# Patient Record
Sex: MALE | Race: WHITE | NOT HISPANIC OR LATINO | Employment: PART TIME | URBAN - METROPOLITAN AREA
[De-identification: names, ages, dates, MRNs, and addresses within clinical notes are randomized per-mention and may not be internally consistent; named-entity substitution may affect disease eponyms.]

---

## 2022-04-10 ENCOUNTER — HISTORICAL (OUTPATIENT)
Dept: ADMINISTRATIVE | Facility: HOSPITAL | Age: 52
End: 2022-04-10
Payer: MEDICAID

## 2022-04-29 VITALS
DIASTOLIC BLOOD PRESSURE: 82 MMHG | SYSTOLIC BLOOD PRESSURE: 137 MMHG | HEIGHT: 74 IN | WEIGHT: 220.25 LBS | OXYGEN SATURATION: 100 % | BODY MASS INDEX: 28.27 KG/M2

## 2022-06-06 DIAGNOSIS — N18.9 CHRONIC KIDNEY DISEASE, UNSPECIFIED CKD STAGE: Primary | ICD-10-CM

## 2022-06-17 RX ORDER — LISINOPRIL 20 MG/1
20 TABLET ORAL DAILY
COMMUNITY
Start: 2022-05-23 | End: 2023-02-22 | Stop reason: SDUPTHER

## 2022-06-17 RX ORDER — LISINOPRIL AND HYDROCHLOROTHIAZIDE 12.5; 2 MG/1; MG/1
1 TABLET ORAL DAILY
COMMUNITY
Start: 2022-01-20 | End: 2022-06-22 | Stop reason: ALTCHOICE

## 2022-06-17 RX ORDER — SILDENAFIL CITRATE 20 MG/1
TABLET ORAL
COMMUNITY
Start: 2022-05-23

## 2022-06-22 ENCOUNTER — OFFICE VISIT (OUTPATIENT)
Dept: NEPHROLOGY | Facility: CLINIC | Age: 52
End: 2022-06-22
Payer: MEDICAID

## 2022-06-22 VITALS
OXYGEN SATURATION: 99 % | WEIGHT: 221.38 LBS | TEMPERATURE: 98 F | BODY MASS INDEX: 27.53 KG/M2 | RESPIRATION RATE: 20 BRPM | DIASTOLIC BLOOD PRESSURE: 77 MMHG | SYSTOLIC BLOOD PRESSURE: 137 MMHG | HEART RATE: 68 BPM | HEIGHT: 75 IN

## 2022-06-22 DIAGNOSIS — N18.2 CKD STAGE G2/A1, GFR 60-89 AND ALBUMIN CREATININE RATIO <30 MG/G: Primary | ICD-10-CM

## 2022-06-22 DIAGNOSIS — Z72.0 TOBACCO ABUSE: ICD-10-CM

## 2022-06-22 PROCEDURE — 4010F ACE/ARB THERAPY RXD/TAKEN: CPT | Mod: CPTII,,, | Performed by: NURSE PRACTITIONER

## 2022-06-22 PROCEDURE — 99213 OFFICE O/P EST LOW 20 MIN: CPT | Mod: S$PBB,,, | Performed by: NURSE PRACTITIONER

## 2022-06-22 PROCEDURE — 4010F PR ACE/ARB THEARPY RXD/TAKEN: ICD-10-PCS | Mod: CPTII,,, | Performed by: NURSE PRACTITIONER

## 2022-06-22 PROCEDURE — 3075F SYST BP GE 130 - 139MM HG: CPT | Mod: CPTII,,, | Performed by: NURSE PRACTITIONER

## 2022-06-22 PROCEDURE — 1159F MED LIST DOCD IN RCRD: CPT | Mod: CPTII,,, | Performed by: NURSE PRACTITIONER

## 2022-06-22 PROCEDURE — 3008F PR BODY MASS INDEX (BMI) DOCUMENTED: ICD-10-PCS | Mod: CPTII,,, | Performed by: NURSE PRACTITIONER

## 2022-06-22 PROCEDURE — 1160F PR REVIEW ALL MEDS BY PRESCRIBER/CLIN PHARMACIST DOCUMENTED: ICD-10-PCS | Mod: CPTII,,, | Performed by: NURSE PRACTITIONER

## 2022-06-22 PROCEDURE — 3078F DIAST BP <80 MM HG: CPT | Mod: CPTII,,, | Performed by: NURSE PRACTITIONER

## 2022-06-22 PROCEDURE — 3066F NEPHROPATHY DOC TX: CPT | Mod: CPTII,,, | Performed by: NURSE PRACTITIONER

## 2022-06-22 PROCEDURE — 1159F PR MEDICATION LIST DOCUMENTED IN MEDICAL RECORD: ICD-10-PCS | Mod: CPTII,,, | Performed by: NURSE PRACTITIONER

## 2022-06-22 PROCEDURE — 99214 OFFICE O/P EST MOD 30 MIN: CPT | Mod: PBBFAC | Performed by: NURSE PRACTITIONER

## 2022-06-22 PROCEDURE — 3066F PR DOCUMENTATION OF TREATMENT FOR NEPHROPATHY: ICD-10-PCS | Mod: CPTII,,, | Performed by: NURSE PRACTITIONER

## 2022-06-22 PROCEDURE — 3075F PR MOST RECENT SYSTOLIC BLOOD PRESS GE 130-139MM HG: ICD-10-PCS | Mod: CPTII,,, | Performed by: NURSE PRACTITIONER

## 2022-06-22 PROCEDURE — 1160F RVW MEDS BY RX/DR IN RCRD: CPT | Mod: CPTII,,, | Performed by: NURSE PRACTITIONER

## 2022-06-22 PROCEDURE — 3078F PR MOST RECENT DIASTOLIC BLOOD PRESSURE < 80 MM HG: ICD-10-PCS | Mod: CPTII,,, | Performed by: NURSE PRACTITIONER

## 2022-06-22 PROCEDURE — 99213 PR OFFICE/OUTPT VISIT, EST, LEVL III, 20-29 MIN: ICD-10-PCS | Mod: S$PBB,,, | Performed by: NURSE PRACTITIONER

## 2022-06-22 PROCEDURE — 3008F BODY MASS INDEX DOCD: CPT | Mod: CPTII,,, | Performed by: NURSE PRACTITIONER

## 2022-06-22 NOTE — PROGRESS NOTES
"[facility] Nephrology Clinic Note    Chief Complaint   Patient presents with    Chronic Kidney Disease     Follow up        History of Present Illness  This is a pleasant 51-year-old  male with past medical history of hypertension, kidney disease, and tobacco abuse. Does report family history of hypertension but no family history of kidney disease.  Presents for follow-up today, denies complaints.    Review of Systems  Twelve point review of systems conducted, negative except as stated in history of present illness.    Review of patient's allergies indicates:   Allergen Reactions    Penicillin Rash       Past Medical History:   Past Medical History:   Diagnosis Date    Chronic kidney disease, unspecified     HTN (hypertension)     Tobacco abuse        Procedure History: History reviewed. No pertinent surgical history.    Family History: family history includes Hypertension in his brother, father, mother, and sister.    Social History:  reports that he has been smoking cigarettes. He has been smoking about 1.00 pack per day. He has never used smokeless tobacco. He reports that he does not drink alcohol and does not use drugs.    Physical Exam:   /77 (BP Location: Left arm, Patient Position: Sitting, BP Method: Large (Automatic))   Pulse 68   Temp 97.8 °F (36.6 °C) (Oral)   Resp 20   Ht 6' 3" (1.905 m)   Wt 100.4 kg (221 lb 6.4 oz)   SpO2 99%   BMI 27.67 kg/m²  Body mass index is 27.67 kg/m².  General appearance: Patient is in no acute distress.  Skin: No rashes or wounds.  HEENT: PERRLA, EOMI, no scleral icterus, no JVD. Neck is supple.  Chest: Respirations are unlabored. Lungs sounds are clear.   Heart: S1, S2.   Abdomen: Benign.  : Deferred.  Extremities: No edema, peripheral pulses are palpable.   Neuro: No focal deficits.     Home Medications:  Current Outpatient Medications   Medication Sig    lisinopriL (PRINIVIL,ZESTRIL) 20 MG tablet Take 20 mg by mouth once daily.    " lisinopriL-hydrochlorothiazide (PRINZIDE,ZESTORETIC) 20-12.5 mg per tablet Take 1 tablet by mouth once daily.    sildenafil (REVATIO) 20 mg Tab SMARTSI-2 Tablet(s) By Mouth     No current facility-administered medications for this visit.        Laboratory Data:   06/15/2022:  Sodium 136, potassium 4.3, chloride 103, CO2 27, glucose 94, BUN 16, creatinine 1.18, estimated GFR 69, albumin 3.8, calcium 8.6, AST 19, ALT 19, alkaline phosphatase 92, bilirubin 0.5    Imaging:  Kidney ultrasound 2022:  The visualized aorta is within normal limits, IVC not visualized due to bowel gas.  Right kidney 9.8 cm in length, left kidney 10.8 cm in length, no hydronephrosis, cystic, or solid renal masses.  Bladder is incompletely distended and otherwise unremarkable.    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 There is no significant proteinuria or active urinary sediment on urinalysis.  Kidney ultrasound is essentially normal.  Importance of avoiding excessive NSAID use explained to the patient.   Serum creatinine has improved.  Continue periodic monitoring.  Return to clinic with routine labs in 8 months.    Tobacco abuse  -     Ambulatory referral/consult to Smoking Cessation Program; Future; Expected date: 2022    BMI 27.0-27.9,adult  Lifestyle and dietary interventions discussed, patient counseled on weight loss using portion control, non sedentary lifestyle, low-carbohydrate/low fat diet.

## 2023-02-14 ENCOUNTER — TELEPHONE (OUTPATIENT)
Dept: PULMONOLOGY | Facility: CLINIC | Age: 53
End: 2023-02-14
Payer: MEDICAID

## 2023-02-14 NOTE — TELEPHONE ENCOUNTER
----- Message from Charito Wilson sent at 2/14/2023  3:16 PM CST -----  Regarding: Labs  Pt would like send labs [facility] Fax # [phone]  Pt # [phone]

## 2023-02-22 ENCOUNTER — OFFICE VISIT (OUTPATIENT)
Dept: NEPHROLOGY | Facility: CLINIC | Age: 53
End: 2023-02-22
Payer: MEDICAID

## 2023-02-22 VITALS
SYSTOLIC BLOOD PRESSURE: 148 MMHG | TEMPERATURE: 98 F | BODY MASS INDEX: 27.63 KG/M2 | WEIGHT: 222.19 LBS | HEART RATE: 66 BPM | HEIGHT: 75 IN | OXYGEN SATURATION: 100 % | RESPIRATION RATE: 18 BRPM | DIASTOLIC BLOOD PRESSURE: 82 MMHG

## 2023-02-22 DIAGNOSIS — Z72.0 TOBACCO ABUSE: ICD-10-CM

## 2023-02-22 DIAGNOSIS — I10 PRIMARY HYPERTENSION: ICD-10-CM

## 2023-02-22 DIAGNOSIS — N18.2 CKD STAGE G2/A1, GFR 60-89 AND ALBUMIN CREATININE RATIO <30 MG/G: Primary | ICD-10-CM

## 2023-02-22 PROCEDURE — 3077F SYST BP >= 140 MM HG: CPT | Mod: CPTII,,, | Performed by: NURSE PRACTITIONER

## 2023-02-22 PROCEDURE — 3066F PR DOCUMENTATION OF TREATMENT FOR NEPHROPATHY: ICD-10-PCS | Mod: CPTII,,, | Performed by: NURSE PRACTITIONER

## 2023-02-22 PROCEDURE — 3077F PR MOST RECENT SYSTOLIC BLOOD PRESSURE >= 140 MM HG: ICD-10-PCS | Mod: CPTII,,, | Performed by: NURSE PRACTITIONER

## 2023-02-22 PROCEDURE — 3079F PR MOST RECENT DIASTOLIC BLOOD PRESSURE 80-89 MM HG: ICD-10-PCS | Mod: CPTII,,, | Performed by: NURSE PRACTITIONER

## 2023-02-22 PROCEDURE — 4010F ACE/ARB THERAPY RXD/TAKEN: CPT | Mod: CPTII,,, | Performed by: NURSE PRACTITIONER

## 2023-02-22 PROCEDURE — 99213 PR OFFICE/OUTPT VISIT, EST, LEVL III, 20-29 MIN: ICD-10-PCS | Mod: S$PBB,,, | Performed by: NURSE PRACTITIONER

## 2023-02-22 PROCEDURE — 3008F PR BODY MASS INDEX (BMI) DOCUMENTED: ICD-10-PCS | Mod: CPTII,,, | Performed by: NURSE PRACTITIONER

## 2023-02-22 PROCEDURE — 4010F PR ACE/ARB THEARPY RXD/TAKEN: ICD-10-PCS | Mod: CPTII,,, | Performed by: NURSE PRACTITIONER

## 2023-02-22 PROCEDURE — 99213 OFFICE O/P EST LOW 20 MIN: CPT | Mod: S$PBB,,, | Performed by: NURSE PRACTITIONER

## 2023-02-22 PROCEDURE — 1159F MED LIST DOCD IN RCRD: CPT | Mod: CPTII,,, | Performed by: NURSE PRACTITIONER

## 2023-02-22 PROCEDURE — 3079F DIAST BP 80-89 MM HG: CPT | Mod: CPTII,,, | Performed by: NURSE PRACTITIONER

## 2023-02-22 PROCEDURE — 1159F PR MEDICATION LIST DOCUMENTED IN MEDICAL RECORD: ICD-10-PCS | Mod: CPTII,,, | Performed by: NURSE PRACTITIONER

## 2023-02-22 PROCEDURE — 3008F BODY MASS INDEX DOCD: CPT | Mod: CPTII,,, | Performed by: NURSE PRACTITIONER

## 2023-02-22 PROCEDURE — 3066F NEPHROPATHY DOC TX: CPT | Mod: CPTII,,, | Performed by: NURSE PRACTITIONER

## 2023-02-22 PROCEDURE — 99214 OFFICE O/P EST MOD 30 MIN: CPT | Mod: PBBFAC | Performed by: NURSE PRACTITIONER

## 2023-02-22 RX ORDER — LISINOPRIL 40 MG/1
40 TABLET ORAL DAILY
Qty: 90 TABLET | Refills: 3 | Status: SHIPPED | OUTPATIENT
Start: 2023-02-22 | End: 2024-02-22

## 2023-02-22 NOTE — PROGRESS NOTES
"[facility]  Nephrology Clinic Note    Chief complaint: Chronic Kidney Disease (RTC, took meds, declined flu vaccine)    History of present illness:   Faustino Morgan is a 52 y.o. White male with past medical history of hypertension, kidney disease, and tobacco abuse. Does report family history of hypertension but no family history of kidney disease.  Presents for follow-up today, denies complaints.    Review of Systems  12 point review of systems conducted, negative except as stated in the history of present illness.    Allergies: Patient is allergic to penicillin.     Past Medical History:  has a past medical history of Chronic kidney disease, unspecified, HTN (hypertension), and Tobacco abuse.    Procedure History:  has no past surgical history on file.    Family History: family history includes Hypertension in his brother, father, mother, and sister.    Social History:  reports that he has been smoking cigarettes. He has been smoking an average of 1 pack per day. He has never used smokeless tobacco. He reports that he does not drink alcohol and does not use drugs.    Physical exam  BP (!) 148/82 (BP Location: Right arm, Patient Position: Sitting, BP Method: Large (Manual))   Pulse 66   Temp 98.1 °F (36.7 °C) (Oral)   Resp 18   Ht 6' 2.8" (1.9 m)   Wt 100.8 kg (222 lb 3.2 oz)   SpO2 100%   BMI 27.92 kg/m²   General appearance: Patient is in no acute distress.  Skin: No rashes or wounds.  HEENT: PERRLA, EOMI, no scleral icterus, no JVD. Neck is supple.  Chest: Respirations are unlabored. Lungs sounds are clear.   Heart: S1, S2.   Abdomen: Benign.  : Deferred.  Extremities: No edema, peripheral pulses are palpable.   Neuro: No focal deficits.     Home Medications:    Current Outpatient Medications:     sildenafil (REVATIO) 20 mg Tab, SMARTSI-2 Tablet(s) By Mouth, Disp: , Rfl:     lisinopriL (PRINIVIL,ZESTRIL) 40 MG tablet, Take 1 tablet (40 mg total) by mouth once daily., Disp: " 90 tablet, Rfl: 3    Laboratory data  02/16/2023:  Urine protein <5, urinalysis unremarkable, sodium 137, potassium 4.2, chloride 102 CO2 30, glucose 95, BUN 17 creatinine 1.23, albumin 3.6, calcium 8.9, alkaline phosphatase 84, AST 17, ALT 31, GFR 65.    Imaging  Kidney ultrasound 03/03/2022:  The visualized aorta is within normal limits, IVC not visualized due to bowel gas.  Right kidney 9.8 cm in length, left kidney 10.8 cm in length, no hydronephrosis, cystic, or solid renal masses.  Bladder is incompletely distended and otherwise unremarkable.    Impression and plan     CKD stage G2/A1, GFR 60-89 and albumin creatinine ratio <30 mg/g  -     Comprehensive Metabolic Panel; Future; Expected date: 01/22/2024  -     Protein/Creatinine Ratio, Urine; Future; Expected date: 01/22/2024  -     Urinalysis, Reflex to Urine Culture; Future; Expected date: 01/22/2024    Possibly analgesic nephropathy. There is no significant proteinuria or active urinary sediment on urinalysis.  Kidney ultrasound is essentially normal.  Importance of avoiding excessive NSAID use and staying well hydrated explained to the patient.   Continue periodic monitoring.  Return to clinic with routine labs in 12 months.    Primary hypertension  -     lisinopriL (PRINIVIL,ZESTRIL) 40 MG tablet; Take 1 tablet (40 mg total) by mouth once daily.  Dispense: 90 tablet; Refill: 3  Blood pressure reading is above goal.  Will increase lisinopril to 40 mg by mouth daily.      Tobacco abuse  Patient was counseled on importance of tobacco use cessation.  Strongly encouraged to quit, offered a referral to a smoking cessation program.        2/22/2023  Sydni Guevara NP  [facility] Nephrology

## 2024-02-14 ENCOUNTER — TELEPHONE (OUTPATIENT)
Dept: NEPHROLOGY | Facility: CLINIC | Age: 54
End: 2024-02-14
Payer: MEDICAID

## 2024-02-14 NOTE — TELEPHONE ENCOUNTER
----- Message from Chana Gallegos sent at 2/14/2024  9:08 AM CST -----  Regarding: Lab Orders  Pt is in need of Lab to be faxed to [facility]. The fax number is [phone]. Pt can be reached at [phone]    Thank You

## 2024-02-22 ENCOUNTER — OFFICE VISIT (OUTPATIENT)
Dept: NEPHROLOGY | Facility: CLINIC | Age: 54
End: 2024-02-22
Payer: MEDICAID

## 2024-02-22 VITALS
DIASTOLIC BLOOD PRESSURE: 72 MMHG | WEIGHT: 218.81 LBS | BODY MASS INDEX: 28.08 KG/M2 | RESPIRATION RATE: 20 BRPM | HEART RATE: 66 BPM | SYSTOLIC BLOOD PRESSURE: 148 MMHG | OXYGEN SATURATION: 99 % | HEIGHT: 74 IN | TEMPERATURE: 98 F

## 2024-02-22 DIAGNOSIS — N18.2 CKD STAGE G2/A2, GFR 60-89 AND ALBUMIN CREATININE RATIO 30-299 MG/G: Primary | ICD-10-CM

## 2024-02-22 DIAGNOSIS — I10 PRIMARY HYPERTENSION: ICD-10-CM

## 2024-02-22 DIAGNOSIS — Z72.0 TOBACCO ABUSE: ICD-10-CM

## 2024-02-22 PROCEDURE — 99214 OFFICE O/P EST MOD 30 MIN: CPT | Mod: PBBFAC | Performed by: NURSE PRACTITIONER

## 2024-02-22 PROCEDURE — 99213 OFFICE O/P EST LOW 20 MIN: CPT | Mod: S$PBB,,, | Performed by: NURSE PRACTITIONER

## 2024-02-22 PROCEDURE — 3077F SYST BP >= 140 MM HG: CPT | Mod: CPTII,,, | Performed by: NURSE PRACTITIONER

## 2024-02-22 PROCEDURE — 1159F MED LIST DOCD IN RCRD: CPT | Mod: CPTII,,, | Performed by: NURSE PRACTITIONER

## 2024-02-22 PROCEDURE — 3066F NEPHROPATHY DOC TX: CPT | Mod: CPTII,,, | Performed by: NURSE PRACTITIONER

## 2024-02-22 PROCEDURE — 3078F DIAST BP <80 MM HG: CPT | Mod: CPTII,,, | Performed by: NURSE PRACTITIONER

## 2024-02-22 PROCEDURE — 3008F BODY MASS INDEX DOCD: CPT | Mod: CPTII,,, | Performed by: NURSE PRACTITIONER

## 2024-02-22 RX ORDER — AMLODIPINE BESYLATE 5 MG/1
5 TABLET ORAL DAILY
Qty: 90 TABLET | Refills: 3 | Status: SHIPPED | OUTPATIENT
Start: 2024-02-22 | End: 2025-02-21

## 2024-02-22 NOTE — PROGRESS NOTES
"[facility]  Nephrology Clinic Note    Chief complaint: Chronic Kidney Disease (Follow up)    History of present illness:   Faustino Morgan is a 53 y.o. White male with past medical history of hypertension, kidney disease, and tobacco abuse.  Patient presents for follow-up appointment in Nephrology Clinic today.    Review of Systems  12 point review of systems conducted, negative except as stated in the history of present illness.    Allergies: Patient is allergic to penicillin.     Past Medical History:  has a past medical history of Chronic kidney disease, unspecified, HTN (hypertension), and Tobacco abuse.    Procedure History:  has no past surgical history on file.    Family History: family history includes Hypertension in his brother, father, mother, and sister.    Social History:  reports that he has been smoking cigarettes. He started smoking about 20 years ago. He has a 20.1 pack-year smoking history. He has never used smokeless tobacco. He reports that he does not drink alcohol and does not use drugs.    Physical exam  BP (!) 148/72 (BP Location: Left arm, Patient Position: Sitting, BP Method: Large (Manual))   Pulse 66   Temp 98.3 °F (36.8 °C) (Oral)   Resp 20   Ht 6' 2" (1.88 m)   Wt 99.2 kg (218 lb 12.8 oz)   SpO2 99%   BMI 28.09 kg/m²   General appearance: Patient is in no acute distress.  Skin: No rashes or wounds.  HEENT: PERRLA, EOMI, no scleral icterus, no JVD. Neck is supple.  Chest: Respirations are unlabored. Lungs sounds are clear.   Heart: S1, S2.   Abdomen: Benign.  : Deferred.  Extremities: No edema, peripheral pulses are palpable.   Neuro: No focal deficits.     Home Medications:    Current Outpatient Medications:     lisinopriL (PRINIVIL,ZESTRIL) 40 MG tablet, Take 1 tablet (40 mg total) by mouth once daily., Disp: 90 tablet, Rfl: 3    sildenafil (REVATIO) 20 mg Tab, SMARTSI-2 Tablet(s) By Mouth, Disp: , Rfl:     amLODIPine (NORVASC) 5 MG tablet, Take 1 " tablet (5 mg total) by mouth once daily., Disp: 90 tablet, Rfl: 3    Laboratory data                                Imaging  Kidney ultrasound 03/03/2022:  The visualized aorta is within normal limits, IVC not visualized due to bowel gas.  Right kidney 9.8 cm in length, left kidney 10.8 cm in length, no hydronephrosis, cystic, or solid renal masses.  Bladder is incompletely distended and otherwise unremarkable.     Impression    ICD-10-CM ICD-9-CM   1. CKD stage G2/A2, GFR 60-89 and albumin creatinine ratio  mg/g  N18.2 585.2   2. Primary hypertension  I10 401.9   3. Tobacco abuse  Z72.0 305.1   4. BMI 28.0-28.9,adult  Z68.28 V85.24        Plan     CKD stage G2/A2, GFR 60-89 and albumin creatinine ratio  mg/g  -     Comprehensive Metabolic Panel; Future; Expected date: 08/10/2024  -     Protein/Creatinine Ratio, Urine; Future; Expected date: 08/10/2024  -     Urinalysis, Reflex to Urine Culture; Future; Expected date: 08/10/2024  Possibly analgesic nephropathy. There is no significant proteinuria or active urinary sediment on urinalysis.  Kidney ultrasound is essentially normal.  Importance of avoiding excessive NSAID use and staying well hydrated explained to the patient.   Continue periodic monitoring.  Return to clinic with routine labs in 6 months.    Primary hypertension  -     amLODIPine (NORVASC) 5 MG tablet; Take 1 tablet (5 mg total) by mouth once daily.  Dispense: 90 tablet; Refill: 3  BP reading is above goal. Will add Amlodipine to the regimen.     Tobacco abuse  Patient was counseled on importance of tobacco use cessation.  Strongly encouraged to quit, offered a referral to a smoking cessation program.    BMI 28.0-28.9,adult  Lifestyle and dietary interventions discussed, patient encouraged to maintain non-sedentary lifestyle and well-balanced diet.           2/22/2024  Sydni Guevara NP  [facility] Nephrology

## 2024-11-07 ENCOUNTER — OFFICE VISIT (OUTPATIENT)
Dept: NEPHROLOGY | Facility: CLINIC | Age: 54
End: 2024-11-07
Payer: MEDICAID

## 2024-11-07 VITALS
HEIGHT: 74 IN | RESPIRATION RATE: 18 BRPM | BODY MASS INDEX: 27.95 KG/M2 | HEART RATE: 69 BPM | WEIGHT: 217.81 LBS | OXYGEN SATURATION: 100 % | DIASTOLIC BLOOD PRESSURE: 76 MMHG | TEMPERATURE: 98 F | SYSTOLIC BLOOD PRESSURE: 130 MMHG

## 2024-11-07 DIAGNOSIS — I10 PRIMARY HYPERTENSION: ICD-10-CM

## 2024-11-07 DIAGNOSIS — Z72.0 TOBACCO ABUSE: ICD-10-CM

## 2024-11-07 DIAGNOSIS — N18.2 CKD STAGE G2/A1, GFR 60-89 AND ALBUMIN CREATININE RATIO <30 MG/G: Primary | ICD-10-CM

## 2024-11-07 PROCEDURE — 99215 OFFICE O/P EST HI 40 MIN: CPT | Mod: PBBFAC | Performed by: NURSE PRACTITIONER

## 2024-11-07 NOTE — PROGRESS NOTES
"[facility]  Nephrology Clinic Note    Chief complaint: Follow-up (RTC, took meds, concerned about lab results)    History of present illness:   Fautsino Morgan is a 54 y.o. White male with past medical history of hypertension, kidney disease, and tobacco abuse.  Patient presents for follow-up appointment in Nephrology Clinic today.    Review of Systems  12 point review of systems conducted, negative except as stated in the history of present illness.    Allergies: Patient is allergic to penicillin.     Past Medical History:  has a past medical history of Chronic kidney disease, unspecified, HTN (hypertension), and Tobacco abuse.    Procedure History:  has no past surgical history on file.    Family History: family history includes Hypertension in his brother, father, mother, and sister.    Social History:  reports that he has been smoking cigarettes. He started smoking about 20 years ago. He has a 20.8 pack-year smoking history. He has never used smokeless tobacco. He reports that he does not drink alcohol and does not use drugs.    Physical exam  /76 (BP Location: Right arm, Patient Position: Sitting)   Pulse 69   Temp 98 °F (36.7 °C) (Oral)   Resp 18   Ht 6' 2.02" (1.88 m)   Wt 98.8 kg (217 lb 13 oz)   SpO2 100%   BMI 27.95 kg/m²   General appearance: Patient is in no acute distress.  Skin: No rashes or wounds.  HEENT: PERRLA, EOMI, no scleral icterus, no JVD. Neck is supple.  Chest: Respirations are unlabored. Lungs sounds are clear.   Heart: S1, S2.   Abdomen: Benign.  : Deferred.  Extremities: No edema, peripheral pulses are palpable.   Neuro: No focal deficits.     Home Medications:    Current Outpatient Medications:     amLODIPine (NORVASC) 5 MG tablet, Take 1 tablet (5 mg total) by mouth once daily., Disp: 90 tablet, Rfl: 3    lisinopriL (PRINIVIL,ZESTRIL) 40 MG tablet, Take 1 tablet (40 mg total) by mouth once daily., Disp: 90 tablet, Rfl: 3    sildenafil (REVATIO) " 20 mg Tab, SMARTSI-2 Tablet(s) By Mouth, Disp: , Rfl:     Laboratory data              Imaging  Kidney ultrasound 2022:  The visualized aorta is within normal limits, IVC not visualized due to bowel gas.  Right kidney 9.8 cm in length, left kidney 10.8 cm in length, no hydronephrosis, cystic, or solid renal masses.  Bladder is incompletely distended and otherwise unremarkable.    Impression    ICD-10-CM ICD-9-CM   1. CKD stage G2/A1, GFR 60-89 and albumin creatinine ratio <30 mg/g  N18.2 585.2   2. Primary hypertension  I10 401.9   3. Tobacco abuse  Z72.0 305.1        Plan  CKD stage G2/A1, GFR 60-89 and albumin creatinine ratio <30 mg/g  -     Comprehensive Metabolic Panel; Future; Expected date: 10/15/2025  -     Protein/Creatinine Ratio, Urine; Future; Expected date: 10/15/2025  -     Urinalysis, Reflex to Urine Culture; Future; Expected date: 10/15/2025  Possibly analgesic nephropathy. There is no significant proteinuria or active urinary sediment on urinalysis.  Kidney ultrasound is essentially normal.  Importance of avoiding excessive NSAID use and staying well hydrated explained to the patient.   Continue periodic monitoring.      Primary hypertension  Blood pressure reading is at goal, continue current antihypertensive regimen and 2 g a day dietary sodium restriction.      Tobacco abuse  Patient was counseled on importance of tobacco use cessation.  Strongly encouraged to quit, offered a referral to a smoking cessation program.      Follow up in about 1 year (around 2025).       Sydni Guevara NP  [facility] Nephrology